# Patient Record
(demographics unavailable — no encounter records)

---

## 2025-01-21 NOTE — HEALTH RISK ASSESSMENT
[Good] : ~his/her~  mood as  good [Yes] : Yes [No] : In the past 12 months have you used drugs other than those required for medical reasons? No [0] : 2) Feeling down, depressed, or hopeless: Not at all (0) [PHQ-2 Negative - No further assessment needed] : PHQ-2 Negative - No further assessment needed [Never] : Never [NO] : No [FreeTextEntry1] : health maintenance [de-identified] : social [[id number]] : 0 [BoneDensityComments] : n/a [ColonoscopyComments] : n/a

## 2025-01-21 NOTE — HISTORY OF PRESENT ILLNESS
[FreeTextEntry1] : Patient is present today to establish care and for a comprehensive Annual Physical Exam. [de-identified] : Patient is a 33yr old female who is present today to establish care and for a comprehensive Annual Physical Exam.  Patient is doing well overall and has not gotten sick since last visit. Plan on seeing nutrition sometimes gets bloating after eating unsure which food related to tends to avoid milk and gluten.  Feels better than last year denies any lightheadedness dizziness did not go to the ER for transfusion has been taking iron every day.  Denies any shortness of breath.  Denies heavy menstrual cycles does follow with gynecology. Denies any CP, chest tightness or SOB. Denies any abdominal pain, urinary symptom, or change in bowel habits. Denies any fever, chills, or night sweats.

## 2025-01-21 NOTE — ASSESSMENT
[FreeTextEntry1] :  Annual Physical Exam: Anemia, unspecified type - BP is stable. Continue current management. - Check A1c, lipid panels, vitamin levels, urine analysis, TSH, Celiac Disease Antibodies, Ferritin, Iron with Total Binding Capacity  -Follow-up with nutritionist discussed weight loss medications currently declined.  - RTO annually or as needed.   Pt verbalized understanding and will reach should any questions/concerns occur.

## 2025-01-21 NOTE — ADDENDUM
[FreeTextEntry1] : I, Cooper Valdez, acted as a scribe on behalf of Dr. Andrew Tipton MD, on 01/21/2025.   All medical entries made by the scribe were at my, Dr. Andrew Tipton MD, direction and personally dictated by me on 01/21/2025. I have reviewed the chart and agree that the record accurately reflects my personal performance of the history, physical exam, assessment and plan. I have also personally directed, reviewed, and agreed with the chart.